# Patient Record
Sex: FEMALE | Race: WHITE | NOT HISPANIC OR LATINO | Employment: UNEMPLOYED | URBAN - METROPOLITAN AREA
[De-identification: names, ages, dates, MRNs, and addresses within clinical notes are randomized per-mention and may not be internally consistent; named-entity substitution may affect disease eponyms.]

---

## 2020-02-12 ENCOUNTER — ESTABLISHED COMPREHENSIVE EXAM (OUTPATIENT)
Dept: URBAN - METROPOLITAN AREA CLINIC 40 | Facility: CLINIC | Age: 55
End: 2020-02-12

## 2020-02-12 DIAGNOSIS — Z79.899: ICD-10-CM

## 2020-02-12 DIAGNOSIS — Z96.1: ICD-10-CM

## 2020-02-12 PROCEDURE — 92499OP2 OPTOMAP RETINAL SCREENING BOTH EYES

## 2020-02-12 PROCEDURE — 92015 DETERMINE REFRACTIVE STATE: CPT

## 2020-02-12 PROCEDURE — 92014 COMPRE OPH EXAM EST PT 1/>: CPT

## 2020-02-12 PROCEDURE — 92083 EXTENDED VISUAL FIELD XM: CPT

## 2020-02-12 ASSESSMENT — VISUAL ACUITY
OS_CC: J2
OD_CC: 20/20-2
OU_CC: J1
OD_CC: J2
OU_CC: 20/20
OU_SC: 20/25
OS_CC: 20/20-1

## 2020-02-12 ASSESSMENT — KERATOMETRY
OS_AXISANGLE_DEGREES: 10
OD_K1POWER_DIOPTERS: 42.25
OD_K2POWER_DIOPTERS: 42
OS_K1POWER_DIOPTERS: 42
OD_AXISANGLE2_DEGREES: 78
OD_AXISANGLE_DEGREES: 168
OS_K2POWER_DIOPTERS: 41
OS_AXISANGLE2_DEGREES: 100

## 2020-02-12 ASSESSMENT — TONOMETRY
OD_IOP_MMHG: 21
OS_IOP_MMHG: 20

## 2020-05-28 NOTE — PATIENT DISCUSSION
Recommend excision with autograft and [address]. RBAs for pterygium excision discussed including recurrence rate and discomfort following surgery.  Advised patient to allow adequate time for eye to heal.

## 2020-07-13 NOTE — PATIENT DISCUSSION
Continue drops as directed. Patient states she is running out of samples of Durezol, Rx for Prednisolone sent to patients pharmacy today, follow drop schedule for tapering of Prednisolone.

## 2020-07-20 NOTE — PATIENT DISCUSSION
Recommend excision with autograft and [facility]. RBAs for pterygium excision discussed including recurrence rate and discomfort following surgery.  Advised patient to allow adequate time for eye to heal.

## 2021-11-18 ASSESSMENT — KERATOMETRY
OD_K2POWER_DIOPTERS: 42
OS_AXISANGLE2_DEGREES: 100
OD_K1POWER_DIOPTERS: 42.25
OD_AXISANGLE_DEGREES: 168
OD_AXISANGLE2_DEGREES: 78
OS_K2POWER_DIOPTERS: 41
OS_AXISANGLE_DEGREES: 10
OS_K1POWER_DIOPTERS: 42

## 2021-11-19 ENCOUNTER — ESTABLISHED COMPREHENSIVE EXAM (OUTPATIENT)
Dept: URBAN - METROPOLITAN AREA CLINIC 40 | Facility: CLINIC | Age: 56
End: 2021-11-19

## 2021-11-19 DIAGNOSIS — H35.372: ICD-10-CM

## 2021-11-19 DIAGNOSIS — H40.053: ICD-10-CM

## 2021-11-19 DIAGNOSIS — Z79.899: ICD-10-CM

## 2021-11-19 DIAGNOSIS — H52.03: ICD-10-CM

## 2021-11-19 PROCEDURE — 92015 DETERMINE REFRACTIVE STATE: CPT

## 2021-11-19 PROCEDURE — 92499OP2 OPTOMAP RETINAL SCREENING BOTH EYES

## 2021-11-19 PROCEDURE — 92014 COMPRE OPH EXAM EST PT 1/>: CPT

## 2021-11-19 ASSESSMENT — VISUAL ACUITY
OD_SC: 20/40
OU_SC: J8
OD_CC: J1+
OU_CC: J1+
OS_CC: J1+
OD_SC: J10-
OU_CC: 20/20
OU_SC: 20/40
OS_SC: 20/50
OS_CC: 20/20
OS_SC: J8-
OD_CC: 20/20-1

## 2021-11-19 ASSESSMENT — TONOMETRY
OS_IOP_MMHG: 20
OD_IOP_MMHG: 20

## 2022-01-24 ASSESSMENT — KERATOMETRY
OD_K2POWER_DIOPTERS: 42
OS_AXISANGLE2_DEGREES: 100
OS_K2POWER_DIOPTERS: 41
OD_AXISANGLE_DEGREES: 168
OD_K1POWER_DIOPTERS: 42.25
OS_AXISANGLE_DEGREES: 10
OD_AXISANGLE2_DEGREES: 78
OS_K1POWER_DIOPTERS: 42

## 2022-01-25 ENCOUNTER — FOLLOW UP (OUTPATIENT)
Dept: URBAN - METROPOLITAN AREA CLINIC 40 | Facility: CLINIC | Age: 57
End: 2022-01-25

## 2022-01-25 DIAGNOSIS — Z79.899: ICD-10-CM

## 2022-01-25 DIAGNOSIS — H40.053: ICD-10-CM

## 2022-01-25 DIAGNOSIS — H40.023: ICD-10-CM

## 2022-01-25 DIAGNOSIS — H35.372: ICD-10-CM

## 2022-01-25 PROCEDURE — 92133 CPTRZD OPH DX IMG PST SGM ON: CPT

## 2022-01-25 PROCEDURE — 92012 INTRM OPH EXAM EST PATIENT: CPT

## 2022-01-25 ASSESSMENT — PACHYMETRY
OD_CT_UM: 607
OS_CT_UM: 597

## 2022-01-25 ASSESSMENT — VISUAL ACUITY
OD_CC: 20/20
OS_CC: 20/20-1
OU_CC: 20/20

## 2023-01-10 ENCOUNTER — FOLLOW UP (OUTPATIENT)
Dept: URBAN - METROPOLITAN AREA CLINIC 40 | Facility: CLINIC | Age: 58
End: 2023-01-10

## 2023-01-10 DIAGNOSIS — H40.053: ICD-10-CM

## 2023-01-10 DIAGNOSIS — H40.023: ICD-10-CM

## 2023-01-10 DIAGNOSIS — Z79.899: ICD-10-CM

## 2023-01-10 PROCEDURE — 92083 EXTENDED VISUAL FIELD XM: CPT

## 2023-01-10 PROCEDURE — 99213 OFFICE O/P EST LOW 20 MIN: CPT

## 2023-01-10 ASSESSMENT — VISUAL ACUITY
OS_CC: 20/20-1
OU_CC: 20/20
OD_CC: 20/20

## 2023-01-10 ASSESSMENT — KERATOMETRY
OD_K1POWER_DIOPTERS: 42.25
OD_AXISANGLE2_DEGREES: 78
OD_AXISANGLE_DEGREES: 168
OS_K2POWER_DIOPTERS: 41
OS_K1POWER_DIOPTERS: 42
OS_AXISANGLE2_DEGREES: 100
OS_AXISANGLE_DEGREES: 10
OD_K2POWER_DIOPTERS: 42

## 2023-01-10 ASSESSMENT — TONOMETRY
OS_IOP_MMHG: 19
OD_IOP_MMHG: 20

## 2023-01-12 ENCOUNTER — COMPREHENSIVE EXAM (OUTPATIENT)
Dept: URBAN - METROPOLITAN AREA CLINIC 40 | Facility: CLINIC | Age: 58
End: 2023-01-12

## 2023-01-12 DIAGNOSIS — H52.203: ICD-10-CM

## 2023-01-12 DIAGNOSIS — H40.053: ICD-10-CM

## 2023-01-12 DIAGNOSIS — H40.023: ICD-10-CM

## 2023-01-12 DIAGNOSIS — H35.372: ICD-10-CM

## 2023-01-12 DIAGNOSIS — H52.03: ICD-10-CM

## 2023-01-12 DIAGNOSIS — H52.4: ICD-10-CM

## 2023-01-12 DIAGNOSIS — Z79.899: ICD-10-CM

## 2023-01-12 PROCEDURE — 92499OP2 OPTOMAP RETINAL SCREENING BOTH EYES

## 2023-01-12 PROCEDURE — 92014 COMPRE OPH EXAM EST PT 1/>: CPT

## 2023-01-12 PROCEDURE — 92015 DETERMINE REFRACTIVE STATE: CPT

## 2023-01-12 ASSESSMENT — KERATOMETRY
OD_K2POWER_DIOPTERS: 42
OD_K1POWER_DIOPTERS: 42.25
OD_AXISANGLE2_DEGREES: 78
OS_K2POWER_DIOPTERS: 41
OS_AXISANGLE_DEGREES: 10
OS_K1POWER_DIOPTERS: 42
OD_AXISANGLE_DEGREES: 168
OS_AXISANGLE2_DEGREES: 100

## 2023-01-12 ASSESSMENT — TONOMETRY
OS_IOP_MMHG: 17
OD_IOP_MMHG: 23

## 2023-01-12 ASSESSMENT — VISUAL ACUITY
OD_SC: 20/25+1
OD_SC: <J12
OS_SC: 20/20-2
OS_SC: J12

## 2023-12-07 ASSESSMENT — KERATOMETRY
OS_K2POWER_DIOPTERS: 41
OD_K1POWER_DIOPTERS: 42.25
OD_AXISANGLE_DEGREES: 168
OS_K1POWER_DIOPTERS: 42
OD_K2POWER_DIOPTERS: 42
OS_AXISANGLE2_DEGREES: 100
OD_AXISANGLE2_DEGREES: 78
OS_AXISANGLE_DEGREES: 10

## 2023-12-08 ENCOUNTER — FOLLOW UP (OUTPATIENT)
Dept: URBAN - METROPOLITAN AREA CLINIC 40 | Facility: CLINIC | Age: 58
End: 2023-12-08

## 2023-12-08 DIAGNOSIS — H40.023: ICD-10-CM

## 2023-12-08 DIAGNOSIS — H35.372: ICD-10-CM

## 2023-12-08 DIAGNOSIS — Z79.899: ICD-10-CM

## 2023-12-08 DIAGNOSIS — H40.053: ICD-10-CM

## 2023-12-08 PROCEDURE — 99213 OFFICE O/P EST LOW 20 MIN: CPT

## 2023-12-08 PROCEDURE — 92133 CPTRZD OPH DX IMG PST SGM ON: CPT

## 2023-12-08 ASSESSMENT — VISUAL ACUITY
OS_CC: 20/20-2
OD_CC: 20/20

## 2023-12-08 ASSESSMENT — TONOMETRY
OD_IOP_MMHG: 20
OS_IOP_MMHG: 18

## 2024-01-17 ASSESSMENT — KERATOMETRY
OS_AXISANGLE_DEGREES: 10
OS_AXISANGLE2_DEGREES: 100
OS_K1POWER_DIOPTERS: 42
OD_K2POWER_DIOPTERS: 42
OD_AXISANGLE_DEGREES: 168
OD_AXISANGLE2_DEGREES: 78
OS_K2POWER_DIOPTERS: 41
OD_K1POWER_DIOPTERS: 42.25

## 2024-01-18 ENCOUNTER — COMPREHENSIVE EXAM (OUTPATIENT)
Dept: URBAN - METROPOLITAN AREA CLINIC 40 | Facility: CLINIC | Age: 59
End: 2024-01-18

## 2024-01-18 DIAGNOSIS — H52.4: ICD-10-CM

## 2024-01-18 DIAGNOSIS — H40.023: ICD-10-CM

## 2024-01-18 DIAGNOSIS — H52.03: ICD-10-CM

## 2024-01-18 DIAGNOSIS — H35.372: ICD-10-CM

## 2024-01-18 DIAGNOSIS — H52.203: ICD-10-CM

## 2024-01-18 DIAGNOSIS — H40.053: ICD-10-CM

## 2024-01-18 PROCEDURE — 92250E RETINAL SCREENING, ELECTIVE

## 2024-01-18 PROCEDURE — 92014 COMPRE OPH EXAM EST PT 1/>: CPT

## 2024-01-18 PROCEDURE — 92015 DETERMINE REFRACTIVE STATE: CPT

## 2024-01-18 ASSESSMENT — VISUAL ACUITY
OD_SC: J12
OD_SC: 20/40
OU_CC: 20/20
OS_SC: J12
OS_SC: 20/40
OD_CC: 20/20
OU_CC: J1
OU_SC: 20/30
OD_CC: J1
OS_CC: J1
OS_PH: 20/20
OD_PH: 20/20
OS_CC: 20/25+2
OU_SC: J12

## 2024-01-18 ASSESSMENT — TONOMETRY
OS_IOP_MMHG: 19
OD_IOP_MMHG: 20

## 2024-02-07 ENCOUNTER — FOLLOW UP (OUTPATIENT)
Dept: URBAN - METROPOLITAN AREA CLINIC 40 | Facility: CLINIC | Age: 59
End: 2024-02-07

## 2024-02-07 DIAGNOSIS — H40.053: ICD-10-CM

## 2024-02-07 DIAGNOSIS — H40.023: ICD-10-CM

## 2024-02-07 PROCEDURE — 92083 EXTENDED VISUAL FIELD XM: CPT

## 2024-02-07 PROCEDURE — 99213 OFFICE O/P EST LOW 20 MIN: CPT

## 2024-02-07 ASSESSMENT — VISUAL ACUITY
OS_CC: 20/20
OU_CC: 20/20
OD_CC: 20/20

## 2024-02-07 ASSESSMENT — TONOMETRY
OS_IOP_MMHG: 18
OD_IOP_MMHG: 20

## 2025-01-09 ENCOUNTER — COMPREHENSIVE EXAM (OUTPATIENT)
Age: 60
End: 2025-01-09

## 2025-01-09 DIAGNOSIS — H35.372: ICD-10-CM

## 2025-01-09 DIAGNOSIS — H52.03: ICD-10-CM

## 2025-01-09 DIAGNOSIS — H52.203: ICD-10-CM

## 2025-01-09 DIAGNOSIS — H40.053: ICD-10-CM

## 2025-01-09 DIAGNOSIS — H40.023: ICD-10-CM

## 2025-01-09 PROCEDURE — 92015 DETERMINE REFRACTIVE STATE: CPT

## 2025-01-09 PROCEDURE — 92250E RETINAL SCREENING, ELECTIVE

## 2025-01-09 PROCEDURE — 92014 COMPRE OPH EXAM EST PT 1/>: CPT

## 2025-02-12 ENCOUNTER — FOLLOW UP (OUTPATIENT)
Age: 60
End: 2025-02-12

## 2025-02-12 DIAGNOSIS — Z79.899: ICD-10-CM

## 2025-02-12 DIAGNOSIS — H35.372: ICD-10-CM

## 2025-02-12 DIAGNOSIS — H40.023: ICD-10-CM

## 2025-02-12 DIAGNOSIS — H40.053: ICD-10-CM

## 2025-02-12 PROCEDURE — 92014 COMPRE OPH EXAM EST PT 1/>: CPT

## 2025-02-12 PROCEDURE — 92083 EXTENDED VISUAL FIELD XM: CPT

## 2025-02-12 PROCEDURE — 92134 CPTRZ OPH DX IMG PST SGM RTA: CPT
